# Patient Record
Sex: FEMALE | Race: WHITE | Employment: FULL TIME | URBAN - NONMETROPOLITAN AREA
[De-identification: names, ages, dates, MRNs, and addresses within clinical notes are randomized per-mention and may not be internally consistent; named-entity substitution may affect disease eponyms.]

---

## 2021-07-17 ENCOUNTER — HOSPITAL ENCOUNTER (EMERGENCY)
Age: 36
Discharge: HOME | End: 2021-07-17
Attending: FAMILY MEDICINE
Payer: COMMERCIAL

## 2021-07-17 VITALS
OXYGEN SATURATION: 96 % | SYSTOLIC BLOOD PRESSURE: 149 MMHG | TEMPERATURE: 97.6 F | HEIGHT: 64 IN | HEART RATE: 68 BPM | RESPIRATION RATE: 16 BRPM | DIASTOLIC BLOOD PRESSURE: 76 MMHG | BODY MASS INDEX: 35.85 KG/M2 | WEIGHT: 210 LBS

## 2021-07-17 DIAGNOSIS — R03.0 ELEVATED BLOOD PRESSURE READING: ICD-10-CM

## 2021-07-17 DIAGNOSIS — K08.89 PAIN, DENTAL: ICD-10-CM

## 2021-07-17 DIAGNOSIS — R22.0 SWELLING OF RIGHT SIDE OF FACE: Primary | ICD-10-CM

## 2021-07-17 PROCEDURE — 99283 EMERGENCY DEPT VISIT LOW MDM: CPT

## 2021-07-17 RX ORDER — TRAMADOL HYDROCHLORIDE 50 MG/1
50 TABLET ORAL EVERY 6 HOURS PRN
Qty: 20 TABLET | Refills: 0 | Status: SHIPPED | OUTPATIENT
Start: 2021-07-17 | End: 2021-07-20

## 2021-07-17 RX ORDER — IBUPROFEN 800 MG/1
800 TABLET ORAL EVERY 6 HOURS PRN
Qty: 30 TABLET | Refills: 0 | Status: SHIPPED | OUTPATIENT
Start: 2021-07-17

## 2021-07-17 ASSESSMENT — PAIN - FUNCTIONAL ASSESSMENT: PAIN_FUNCTIONAL_ASSESSMENT: PREVENTS OR INTERFERES SOME ACTIVE ACTIVITIES AND ADLS

## 2021-07-17 ASSESSMENT — ENCOUNTER SYMPTOMS
VOMITING: 0
ABDOMINAL PAIN: 0
DIARRHEA: 0
SINUS PRESSURE: 0
BACK PAIN: 0
CHEST TIGHTNESS: 0
SHORTNESS OF BREATH: 0
NAUSEA: 0
FACIAL SWELLING: 1
COLOR CHANGE: 0
SORE THROAT: 0
WHEEZING: 0

## 2021-07-17 ASSESSMENT — PAIN SCALES - GENERAL: PAINLEVEL_OUTOF10: 10

## 2021-07-17 ASSESSMENT — PAIN DESCRIPTION - LOCATION: LOCATION: TEETH

## 2021-07-17 ASSESSMENT — PAIN DESCRIPTION - ORIENTATION: ORIENTATION: RIGHT

## 2021-07-17 ASSESSMENT — PAIN DESCRIPTION - DESCRIPTORS: DESCRIPTORS: ACHING

## 2021-07-17 ASSESSMENT — PAIN DESCRIPTION - PAIN TYPE: TYPE: ACUTE PAIN

## 2021-07-17 NOTE — ED PROVIDER NOTES
[address] [facility]          CHIEF COMPLAINT       Chief Complaint   Patient presents with    Dental Pain       Nurses Notes reviewed and I agree except as noted in the HPI. HISTORY OF PRESENT ILLNESS    Daren Salas is a 39 y.o. female who presents for evaluation of right upper wisdom tooth pain. Patient has some facial swelling to this region as well. Pain has been present for the past 1 week. Patient states that pain initially started on the left side but is now located on the right side. She rates her pain a 10/10 in severity. Patient has been icing the affected region and taking Tylenol in addition to ibuprofen without much relief. Pain is even disrupting sleep. REVIEW OF SYSTEMS     Review of Systems   Constitutional: Negative for appetite change, chills, fatigue and fever. HENT: Positive for dental problem and facial swelling. Negative for sinus pressure and sore throat. Respiratory: Negative for chest tightness, shortness of breath and wheezing. Cardiovascular: Negative for chest pain and leg swelling. Gastrointestinal: Negative for abdominal pain, diarrhea, nausea and vomiting. Genitourinary: Negative for dysuria, flank pain and frequency. Musculoskeletal: Negative for back pain, gait problem, joint swelling and neck stiffness. Skin: Negative for color change and rash. Neurological: Negative for dizziness, light-headedness and headaches. Psychiatric/Behavioral: Negative for agitation and hallucinations. The patient is not nervous/anxious. PAST MEDICAL HISTORY    has no past medical history on file. SURGICAL HISTORY      has no past surgical history on file. CURRENT MEDICATIONS       Previous Medications    No medications on file       ALLERGIES     has No Known Allergies.     FAMILY HISTORY     She indicated that the status of her father is unknown.   family history includes Diabetes in her father; High Cholesterol in her father. SOCIAL HISTORY      reports that she has never smoked. She has never used smokeless tobacco. She reports that she does not drink alcohol and does not use drugs. PHYSICAL EXAM     INITIAL VITALS:  height is 5' 4\" (1.626 m) and weight is 210 lb (95.3 kg). Her tympanic temperature is 97.6 °F (36.4 °C). Her blood pressure is 149/76 (abnormal) and her pulse is 68. Her respiration is 16 and oxygen saturation is 96%. Physical Exam  Vitals and nursing note reviewed. Constitutional:       General: She is not in acute distress. Appearance: She is well-developed. HENT:      Head: Normocephalic and atraumatic. Mouth/Throat:      Mouth: Mucous membranes are moist.      Pharynx: No oropharyngeal exudate or posterior oropharyngeal erythema. Comments: Right-sided cheek swelling noted. Patient has bilateral upper wisdom teeth erupting through the gums, right greater than left. No periodontal abscess noted. Eyes:      General:         Right eye: No discharge. Left eye: No discharge. Conjunctiva/sclera: Conjunctivae normal.   Neck:      Comments: No supraclavicular lymphadenopathy. Cardiovascular:      Rate and Rhythm: Normal rate and regular rhythm. Heart sounds: Normal heart sounds. Pulmonary:      Effort: Pulmonary effort is normal.      Breath sounds: Normal breath sounds. Abdominal:      General: Bowel sounds are normal. There is no distension. Palpations: Abdomen is soft. There is no mass. Tenderness: There is no abdominal tenderness. Musculoskeletal:      Cervical back: Normal range of motion and neck supple. Lymphadenopathy:      Cervical: No cervical adenopathy. Skin:     General: Skin is warm and dry. Neurological:      Mental Status: She is alert.    Psychiatric:         Mood and Affect: Mood normal.         Behavior: Behavior normal.         DIFFERENTIAL DIAGNOSIS:   [place] tooth pain, erupting wisdom tooth, periodontal abscess    DIAGNOSTIC RESULTS     LABS:   Labs Reviewed - No data to display    DEPARTMENT COURSE:   Vitals:    Vitals:    07/17/21 1400   BP: (!) 149/76   Pulse: 68   Resp: 16   Temp: 97.6 °F (36.4 °C)   TempSrc: Tympanic   SpO2: 96%   Weight: 210 lb (95.3 kg)   Height: 5' 4\" (1.626 m)       MDM:  Patient presents for evaluation of dental pain and associated facial swelling. [place] teeth in the upper gums are noted to be erupting through the gums. Patient is recommended to follow-up with a dentist, prescribed penicillin, and instructed to take Tylenol and Motrin as directed, and use Ultram as needed as directed for severe pain. Patient also recommended to follow-up with her PCP as blood pressure is noted to be elevated while in the department. CRITICAL CARE:   None    CONSULTS:  None    PROCEDURES:  None    FINAL IMPRESSION      1. Swelling of right side of face    2. Pain, dental    3. Elevated blood pressure reading          DISPOSITION/PLAN   Discharge    PATIENT REFERRED TO:  Your dentist    Schedule an appointment as soon as possible for a visit in 2 days  wisdom tooth pain      DISCHARGEMEDICATIONS:  New Prescriptions    IBUPROFEN (ADVIL;MOTRIN) 800 MG TABLET    Take 1 tablet by mouth every 6 hours as needed for Pain    TRAMADOL (ULTRAM) 50 MG TABLET    Take 1 tablet by mouth every 6 hours as needed for Pain for up to 20 doses.        (Please note that portions of this note were completedwith a voice recognition program.  Efforts were made to edit the dictations but occasionally words are mis-transcribed.)    MD Rosanna Perla MD  07/17/21 6882

## 2021-07-17 NOTE — ED NOTES
Pt resting, resp even and unlabored, skin pink, warm and dry. Pt given discharge instructions and verbalizes understanding, pt released.      Scott Marte RN  07/17/21 5421

## 2021-07-17 NOTE — ED NOTES
Pt complains of r sided tooth pain and r ear pain, pt states the pain started 2 days ago. Pt denies fever, nausea or vomiting. Pt alert, resp even and unlabored, skin pink, warm and dry, r sided facial swelling noted.      Peter Del Rosario RN  07/17/21 3938

## 2022-01-15 ENCOUNTER — HOSPITAL ENCOUNTER (EMERGENCY)
Age: 37
Discharge: HOME | End: 2022-01-15
Attending: EMERGENCY MEDICINE
Payer: COMMERCIAL

## 2022-01-15 VITALS
DIASTOLIC BLOOD PRESSURE: 83 MMHG | TEMPERATURE: 98.1 F | BODY MASS INDEX: 34.55 KG/M2 | WEIGHT: 215 LBS | SYSTOLIC BLOOD PRESSURE: 143 MMHG | RESPIRATION RATE: 16 BRPM | OXYGEN SATURATION: 98 % | HEIGHT: 66 IN | HEART RATE: 93 BPM

## 2022-01-15 DIAGNOSIS — R09.81 NASAL CONGESTION: Primary | ICD-10-CM

## 2022-01-15 DIAGNOSIS — U07.1 COVID-19 VIRUS INFECTION: ICD-10-CM

## 2022-01-15 LAB — SARS-COV-2, NAAT: DETECTED

## 2022-01-15 PROCEDURE — 87635 SARS-COV-2 COVID-19 AMP PRB: CPT

## 2022-01-15 PROCEDURE — 99283 EMERGENCY DEPT VISIT LOW MDM: CPT

## 2022-01-15 NOTE — ED PROVIDER NOTES
[address]  [address]  [address]  Phone: [phone]    eMERGENCY dEPARTMENT eNCOUnter           [address] [facility]       Chief Complaint   Patient presents with    Nasal Congestion     cough, denies SOB or fever. \"I get bronchitis a lot. \"       Nurses Notes reviewed and I agree except as noted in the HPI. HISTORY OF PRESENT ILLNESS    Nura Romero is a 39 y.o. female who presented via private vehicle with above-mentioned complaints. Symptoms started 2 days ago with nasal congestion and mild nonproductive cough. She denies fever or chills. She denies shortness of breath or chest pain. She denies nausea or vomiting. REVIEW OF SYSTEMS     None except as mentioned above    PAST MEDICAL HISTORY    has a past medical history of Pneumonia. SURGICAL HISTORY      has no past surgical history on file. CURRENT MEDICATIONS       Previous Medications    FEXOFENADINE HCL (MUCINEX ALLERGY PO)    Take by mouth    IBUPROFEN (ADVIL;MOTRIN) 800 MG TABLET    Take 1 tablet by mouth every 6 hours as needed for Pain       ALLERGIES     is allergic to penicillins. FAMILY HISTORY     She indicated that the status of her father is unknown.   family history includes Diabetes in her father; High Cholesterol in her father. SOCIAL HISTORY      reports that she has never smoked. She has never used smokeless tobacco. She reports that she does not drink alcohol and does not use drugs. PHYSICAL EXAM     INITIAL VITALS:  height is 5' 6\" (1.676 m) and weight is 215 lb (97.5 kg). Her temporal temperature is 98.1 °F (36.7 °C). Her blood pressure is 143/83 (abnormal) and her pulse is 93. Her respiration is 16 and oxygen saturation is 98%. Physical Exam  Constitutional:       General: She is not in acute distress. Appearance: Normal appearance. She is not ill-appearing. HENT:      Nose: Congestion present.       Mouth/Throat:      Pharynx: No oropharyngeal exudate or posterior oropharyngeal erythema. Eyes:      Conjunctiva/sclera: Conjunctivae normal.   Cardiovascular:      Rate and Rhythm: Normal rate and regular rhythm. Pulses: Normal pulses. Heart sounds: No murmur heard. Pulmonary:      Effort: Pulmonary effort is normal. No respiratory distress. Breath sounds: Normal breath sounds. Musculoskeletal:      Cervical back: Normal range of motion. Neurological:      Mental Status: She is alert. DIFFERENTIAL DIAGNOSIS:       DIAGNOSTIC RESULTS         LABS:   Labs Reviewed   COVID-19, RAPID - Abnormal; Notable for the following components:       Result Value    SARS-CoV-2, NAAT DETECTED (*)     All other components within normal limits   COVID was positive. EMERGENCY DEPARTMENT COURSE:   Vitals:    Vitals:    01/15/22 1109   BP: (!) 143/83   Pulse: 93   Resp: 16   Temp: 98.1 °F (36.7 °C)   TempSrc: Temporal   SpO2: 98%   Weight: 215 lb (97.5 kg)   Height: 5' 6\" (1.676 m)   Patient does not appear ill or toxic. She was reassured. I discussed diagnosis and treatment plan with her. FINAL IMPRESSION      1. Nasal congestion    2. COVID-19 virus infection          DISPOSITION/PLAN   Discharged home in good condition.     PATIENT REFERRED TO:  Sophie West MD    In 2 days        DISCHARGE MEDICATIONS:  New Prescriptions    No medications on file       (Please note that portions of this note were completed with a voice recognition program.  Efforts were made to edit the dictations but occasionally words are mis-transcribed.)    MD Kacie Saxena MD  01/15/22 4135

## 2022-01-15 NOTE — ED TRIAGE NOTES
Pt with cough and congestion, denies SOB or fever. States she usually gets bronchitis about once a year. Had pneumonia a few years ago.

## 2022-01-15 NOTE — ED NOTES
AVS rev'd with pt. And copy given. Pulse regular. Extremities warm. Respirations regular and quiet. Mucous membranes pink & moist. Alert and oriented times 3. No nausea or vomiting. Range of motion within patient's limits. Skin pink, warm and dry. Calm and cooperative.      Justin Palacio RN  01/15/22 4499

## 2023-07-06 ENCOUNTER — HOSPITAL ENCOUNTER (EMERGENCY)
Age: 38
Discharge: HOME | End: 2023-07-06
Attending: EMERGENCY MEDICINE
Payer: COMMERCIAL

## 2023-07-06 VITALS
OXYGEN SATURATION: 97 % | RESPIRATION RATE: 15 BRPM | SYSTOLIC BLOOD PRESSURE: 148 MMHG | TEMPERATURE: 98 F | HEART RATE: 74 BPM | DIASTOLIC BLOOD PRESSURE: 61 MMHG

## 2023-07-06 DIAGNOSIS — H92.02 LEFT EAR PAIN: Primary | ICD-10-CM

## 2023-07-06 DIAGNOSIS — H69.82 DYSFUNCTION OF LEFT EUSTACHIAN TUBE: ICD-10-CM

## 2023-07-06 PROCEDURE — 99283 EMERGENCY DEPT VISIT LOW MDM: CPT

## 2023-07-06 RX ORDER — FLUTICASONE PROPIONATE 50 MCG
2 SPRAY, SUSPENSION (ML) NASAL DAILY
Qty: 16 G | Refills: 0 | Status: SHIPPED | OUTPATIENT
Start: 2023-07-06

## 2023-07-06 ASSESSMENT — PAIN DESCRIPTION - LOCATION: LOCATION: EAR

## 2023-07-06 ASSESSMENT — PAIN DESCRIPTION - ORIENTATION: ORIENTATION: LEFT

## 2023-07-06 ASSESSMENT — PAIN DESCRIPTION - DESCRIPTORS: DESCRIPTORS: PRESSURE

## 2023-07-06 ASSESSMENT — PAIN SCALES - GENERAL: PAINLEVEL_OUTOF10: 2

## 2023-07-06 ASSESSMENT — PAIN - FUNCTIONAL ASSESSMENT: PAIN_FUNCTIONAL_ASSESSMENT: 0-10

## 2023-07-06 NOTE — ED TRIAGE NOTES
Pt arrival to the ER with complaint of left ear pain and fullness. Patient is alert and oriented and breathing with ease. Vitals as documented. Pt resting in cot.

## 2023-07-06 NOTE — DISCHARGE INSTRUCTIONS
Please start using Flonase nasal spray as directed  Chewing gum is helpful opening your muffled ear  Please return if worse or new symptoms

## 2023-07-06 NOTE — ED NOTES
Patient in stable condition. Alert and oriented x3. Unlabored breathing present. Patient aware of plan of care. Patient discharge instructions given and explained. Follow up information instructions given. Prescription order explained to patient. Pharmacy with patient verified. Patient agreeable to plan of care. Patient states understanding and denies any questions or concerns. Patient ambulated out of ER with no complications.         Laila Wilder RN  07/06/23 8922

## 2025-03-04 ENCOUNTER — HOSPITAL ENCOUNTER (EMERGENCY)
Age: 40
Discharge: HOME | End: 2025-03-04
Attending: EMERGENCY MEDICINE
Payer: COMMERCIAL

## 2025-03-04 VITALS
OXYGEN SATURATION: 98 % | HEART RATE: 78 BPM | DIASTOLIC BLOOD PRESSURE: 87 MMHG | TEMPERATURE: 98.4 F | RESPIRATION RATE: 16 BRPM | SYSTOLIC BLOOD PRESSURE: 140 MMHG

## 2025-03-04 DIAGNOSIS — J06.9 UPPER RESPIRATORY TRACT INFECTION, UNSPECIFIED TYPE: Primary | ICD-10-CM

## 2025-03-04 LAB
INFLUENZA A BY PCR: NOT DETECTED
INFLUENZA B BY PCR: NOT DETECTED
SARS-COV-2 RNA, RT PCR: NOT DETECTED

## 2025-03-04 PROCEDURE — 87636 SARSCOV2 & INF A&B AMP PRB: CPT

## 2025-03-04 PROCEDURE — 99283 EMERGENCY DEPT VISIT LOW MDM: CPT

## 2025-03-04 RX ORDER — DOXYCYCLINE HYCLATE 100 MG
100 TABLET ORAL 2 TIMES DAILY
Qty: 14 TABLET | Refills: 0 | Status: SHIPPED | OUTPATIENT
Start: 2025-03-04 | End: 2025-03-11

## 2025-03-04 RX ORDER — FLUTICASONE PROPIONATE 50 MCG
2 SPRAY, SUSPENSION (ML) NASAL DAILY
Qty: 16 G | Refills: 0 | Status: SHIPPED | OUTPATIENT
Start: 2025-03-04

## 2025-03-04 RX ORDER — BENZONATATE 100 MG/1
100 CAPSULE ORAL 2 TIMES DAILY PRN
Qty: 14 CAPSULE | Refills: 0 | Status: SHIPPED | OUTPATIENT
Start: 2025-03-04 | End: 2025-03-11

## 2025-03-04 ASSESSMENT — PAIN - FUNCTIONAL ASSESSMENT: PAIN_FUNCTIONAL_ASSESSMENT: NONE - DENIES PAIN

## 2025-03-04 NOTE — ED PROVIDER NOTES
condition      DISCHARGE MEDICATIONS:  New Prescriptions    BENZONATATE (TESSALON) 100 MG CAPSULE    Take 1 capsule by mouth 2 times daily as needed for Cough    DOXYCYCLINE HYCLATE (VIBRA-TABS) 100 MG TABLET    Take 1 tablet by mouth 2 times daily for 7 days       (Please note that portions of this note were completed with a voice recognition program.  Efforts were made to edit the dictations but occasionally words are mis-transcribed.)    Americo Zavala MD (electronically signed)  Attending Emergency Physician                       Americo Zavala MD  03/04/25 1991

## 2025-03-04 NOTE — ED NOTES
Patient presents today with complaints of a cough for about a week.  She had negative home flu/covid test.  She reports sinus congestion as well.  Denies sore throat, fever, or body aches.

## 2025-03-04 NOTE — DISCHARGE INSTRUCTIONS
Use Tylenol or Motrin for aches or pains.  Continue Flonase to decrease inflammation in your sinuses.  Take doxycycline twice a day.  This treats respiratory infections bronchitis and sinus infections.  Use Tessalon for cough.  If this is a virus the antibiotic will not help although the Flonase and the cough medication should help out.